# Patient Record
Sex: FEMALE
[De-identification: names, ages, dates, MRNs, and addresses within clinical notes are randomized per-mention and may not be internally consistent; named-entity substitution may affect disease eponyms.]

---

## 2018-04-02 ENCOUNTER — HOSPITAL ENCOUNTER (EMERGENCY)
Dept: HOSPITAL 14 - H.ER | Age: 63
Discharge: HOME | End: 2018-04-02
Payer: COMMERCIAL

## 2018-04-02 VITALS
DIASTOLIC BLOOD PRESSURE: 52 MMHG | TEMPERATURE: 97.3 F | RESPIRATION RATE: 18 BRPM | SYSTOLIC BLOOD PRESSURE: 112 MMHG | HEART RATE: 66 BPM | OXYGEN SATURATION: 99 %

## 2018-04-02 DIAGNOSIS — M17.12: Primary | ICD-10-CM

## 2018-04-02 DIAGNOSIS — E78.00: ICD-10-CM

## 2018-04-02 DIAGNOSIS — Z88.0: ICD-10-CM

## 2018-04-02 DIAGNOSIS — I10: ICD-10-CM

## 2018-04-02 NOTE — RAD
PROCEDURE:  Left Knee Radiographs.



HISTORY:

Pain. No history of recent/ related trauma provided



COMPARISON:

None.



FINDINGS:



BONES:

No acute fracture. Proliferative hypertrophic changes emanating from 

the femoral condyle and tibial plateau regions. 



JOINTS:

Degenerative changes medial compartment and patellofemoral joint 



JOINT EFFUSION:

None. 



OTHER FINDINGS:

None.



IMPRESSION:

Normal radiographs of the left knee.

## 2018-04-02 NOTE — ED PDOC
Lower Extremity Pain/Injury


Time Seen by Provider: 04/02/18 15:11


Chief Complaint (Nursing): Lower Extremity Problem/Injury


Chief Complaint (Provider): Left Knee Pain


History Per: Patient


History/Exam Limitations: no limitations


Onset/Duration Of Symptoms: Days (x1 week)


Current Symptoms Are (Timing): Still Present


Additional Complaint(s): 


61 y/o female with a pmhx of IBS, asthma, HTN, and hypercholesterolemia, who 

was brought in via EMS for evaluation of left leg injury. Patient states that 1 

week ago she woke up with a tightness behind her left knee. States that the 

sensation has been progressively worsening. Says she told her  

who advises her it may be a hamstring injury and he performed stretches with 

her for the past week with no improvement. Patient reports that while walking 

up the stairs today in her apartment complex, while carrying bags, her left 

knee gave out on her with sudden onset of pain. Patient reports she has not 

been taking medication for the pain, because its only painful when she bears 

weight. She denies calf pain, leg swelling, history of left knee injury, 

shortness of breath, chest pain, nausea, vomiting, and fever.





PMD: Jarrod Green








Past Medical History


Reviewed: Historical Data, Nursing Documentation, Vital Signs


Vital Signs: 





 Last Vital Signs











Temp  97.3 F L  04/02/18 15:10


 


Pulse  66   04/02/18 15:10


 


Resp  18   04/02/18 15:10


 


BP  112/52 L  04/02/18 15:10


 


Pulse Ox  99   04/02/18 15:10














- Medical History


PMH: Anxiety, Asthma, HTN, Hypercholesterolemia


   Denies: Chronic Kidney Disease





- Surgical History


Surgical History: No Surg Hx





- Family History


Family History: States: Unknown Family Hx





- Social History


Current smoker - smoking cessation education provided: No


Alcohol: None


Drugs: Denies





- Home Medications


Home Medications: 


 Ambulatory Orders











 Medication  Instructions  Recorded


 


Enalapril Maleate [Enalapril 1 tab PO DAILY 02/09/16





Maleate]  


 


Esomeprazole Magnesium [Nexium] 20 tab PO DAILY 02/09/16


 


Methylprednisolone [Medrol Dosepak] 4 mg PO ASDIR #1 pkg 02/09/16


 


Mometasone [Asmanex Twisthaler 110 1 puff INH BID 02/09/16





MCG]  


 


Potassium Chloride [Klor-Con 8] 1 tab PO BID 02/09/16


 


Simethicone [Gas-X] 1 tab PO QID 02/09/16


 


Simvastatin [Simvastatin] 1 tab PO DAILY 02/09/16


 


chlordiazePOXIDE-Clinidium [Librax 1 cap PO PRN PRN 02/09/16





5 MG-2.5 MG]  


 


hydroCHLOROthiazide [Hydrodiuril] 1 tab PO DAILY 02/09/16


 


Naproxen/Esomeprazole Mag [Vimovo 1 each PO BID #20 tab 04/02/18





Dr 500-20 mg Tablet]  














- Allergies


Allergies/Adverse Reactions: 


 Allergies











Allergy/AdvReac Type Severity Reaction Status Date / Time


 


ciprofloxacin [From Cipro] Allergy  RASH Verified 02/09/16 18:19


 


ciprofloxacin HCl Allergy  RASH Verified 02/09/16 18:19





[From Cipro]     


 


Penicillins Allergy  RASH Verified 02/09/16 18:19














Review of Systems


ROS Statement: Except As Marked, All Systems Reviewed And Found Negative


Constitutional: Negative for: Fever


Cardiovascular: Negative for: Chest Pain


Respiratory: Negative for: Shortness of Breath


Gastrointestinal: Negative for: Nausea, Vomiting


Musculoskeletal: Positive for: Leg Pain





Physical Exam





- Reviewed


Nursing Documentation Reviewed: Yes


Vital Signs Reviewed: Yes





- Physical Exam


Comments: 


GENERAL APPEARANCE: Patient is awake, alert, oriented x 3, in no acute 

distress. 


SKIN:  Warm, dry; (-) cyanosis.


CHEST AND RESPIRATORY: (-) chest wall tenderness. Lungs: (-) rales, (-) rhonchi

, (-) wheezes; breath sounds equal bilaterally.


HEART AND CARDIOVASCULAR: (-) irregularity; (-) murmur, (-) gallop.


ABDOMEN AND GI: Soft; (-) tenderness.


LEFT KNEE:  (-) reproducible tenderness, (+) decreased flexion secondary to pain

, (-) effusion, (-) erythema. Negative anterior and posterior draw sign, no 

evidence of instability on valgus or varus stress. (-) calf tenderness, (-) 

pedal edema. Positive distal pulses, sensation intact throughout. 








- ECG


O2 Sat by Pulse Oximetry: 99 (RA)


Pulse Ox Interpretation: Normal





Medical Decision Making


Medical Decision Making: 


Time: 15:35


Initial Impression: Acute knee pain 


Plan:   


--Left knee X-Ray


--Ultram 50mg PO (Patient will not be driving home)


--Zofran 4mg PO


--Reevaluation





Time: 17:12


PROCEDURE:  Left Knee Radiographs.


HISTORY:


Pain. No history of recent/ related trauma provided


COMPARISON:


None.


FINDINGS:


BONES:


No acute fracture. Proliferative hypertrophic changes emanating from the 

femoral condyle and tibial plateau regions. 


JOINTS:


Degenerative changes medial compartment and patellofemoral joint 


JOINT EFFUSION:


None. 


OTHER FINDINGS:


None.


IMPRESSION:


Normal radiographs of the left knee.





--Ultrasound ordered to rule out DVT. 





Time: 1900


US Left Lower Extremity:


HISTORY:


Knee pain .  


PRIORS:


None. 


FINDINGS:


2-D, color and duplex Doppler analysis of the lower extremity venous 

circulation using routine protocol from the femoral veins through the popliteal 

veins.


Venous compressibility: Normal. 


Flow and augmentation patterns: Normal. 


Visualized veins upper third of calf: Normal. 


Baker cyst: None. 


IMPRESSION:


No sonographic or Doppler evidence for DVT in left lower extremity








Time: 1945


On re-evaluation, patient reports improvement of symptoms, ambulatory in ED 

with steady gait. On exam, patient remains AAOx3, in no acute distress. Lungs 

clear to auscultation, cardiac RRR, abdomen soft, non-tender, repeat neuro exam 

shows no focal findings. VSS.


Lab/Diagnostic results d/w the patient in great detail. Diagnosis of acute knee 

pain/sprain, DJD of knee d/w the patient. 


Based on history, exam and diagnostic results, plan will be for outpatient 

follow up. 


Patient instructed to follow-up with pmd / referral provided / the clinic  in 1-

2 days without fail. Advised to take medication as prescribed. Return to the 

emergency room at any time for any new or worsening symptoms. Patient states 

she fully agrees with and understands discharge instructions. States that she 

agrees with the plan and disposition. Verbalized and repeated discharge 

instructions and plan. I have given the patient opportunity to ask any 

additional questions.





--------------------------------------------------------------------------------

-----------------


Scribe Attestation:   


Documented by Demetrius Esteves, acting as a scribe for Amira Hummel PA-C.





Provider Scribe Attestation:


All medical record entries made by the Scribe were at my direction and 

personally dictated by me. I have reviewed the chart and agree that the record 

accurately reflects my personal performance of the history, physical exam, 

medical decision making, and the department course for this patient. I have 

also personally directed, reviewed, and agree with the discharge instructions 

and disposition. 








Disposition





- Clinical Impression


Clinical Impression: 


 Knee pain, acute, Knee sprain, Osteoarthritis of knee








- Patient ED Disposition


Is Patient to be Admitted: No


Counseled Patient/Family Regarding: Studies Performed, Diagnosis, Need For 

Followup, Rx Given





- Disposition


Referrals: 


Wiliam Machado III, MD [Staff Provider] - 


Disposition: Routine/Home


Disposition Time: 19:44


Condition: STABLE


Prescriptions: 


Naproxen/Esomeprazole Mag [Vimovo Dr 500-20 mg Tablet] 1 each PO BID #20 tab


Instructions:  Osteoarthritis, Knee Sprain (DC), Knee Pain


Forms:  [facility] (English)


Print Language: ENGLISH

## 2018-04-02 NOTE — US
HISTORY:

Knee pain .  



PRIORS:

None. 



FINDINGS:

2-D, color and duplex Doppler analysis of the lower extremity venous 

circulation using routine protocol from the femoral veins through the 

popliteal veins.



Venous compressibility: Normal. 



Flow and augmentation patterns: Normal. 



Visualized veins upper third of calf: Normal. 



Baker cyst: None. 



IMPRESSION:

No sonographic or Doppler evidence for DVT in left lower extremity.

## 2021-06-01 ENCOUNTER — PREPPED CHART (OUTPATIENT)
Dept: URBAN - METROPOLITAN AREA CLINIC 110 | Facility: CLINIC | Age: 66
End: 2021-06-01

## 2021-06-01 PROBLEM — H25.813 COMBINED SENILE CATARACT: Noted: 2021-06-01

## 2021-06-01 PROBLEM — H04.123 DRY EYE SYNDROME: Noted: 2021-06-01

## 2022-09-09 ENCOUNTER — ESTABLISHED COMPREHENSIVE EXAM (OUTPATIENT)
Dept: URBAN - METROPOLITAN AREA CLINIC 110 | Facility: CLINIC | Age: 67
End: 2022-09-09

## 2022-09-09 DIAGNOSIS — H04.123: ICD-10-CM

## 2022-09-09 DIAGNOSIS — H40.013: ICD-10-CM

## 2022-09-09 DIAGNOSIS — H01.116: ICD-10-CM

## 2022-09-09 DIAGNOSIS — H01.113: ICD-10-CM

## 2022-09-09 DIAGNOSIS — H52.7: ICD-10-CM

## 2022-09-09 DIAGNOSIS — H43.393: ICD-10-CM

## 2022-09-09 DIAGNOSIS — H10.13: ICD-10-CM

## 2022-09-09 DIAGNOSIS — H25.813: ICD-10-CM

## 2022-09-09 PROCEDURE — 92015 DETERMINE REFRACTIVE STATE: CPT

## 2022-09-09 PROCEDURE — 92202 OPSCPY EXTND ON/MAC DRAW: CPT

## 2022-09-09 PROCEDURE — 92014 COMPRE OPH EXAM EST PT 1/>: CPT

## 2022-09-09 ASSESSMENT — TONOMETRY
OD_IOP_MMHG: 17
OS_IOP_MMHG: 18

## 2022-09-09 ASSESSMENT — VISUAL ACUITY
OS_CC: 20/25
OU_CC: J1+
OD_CC: 20/40

## 2022-09-23 ENCOUNTER — ESTABLISHED (OUTPATIENT)
Dept: URBAN - METROPOLITAN AREA CLINIC 110 | Facility: CLINIC | Age: 67
End: 2022-09-23

## 2022-09-23 DIAGNOSIS — H01.113: ICD-10-CM

## 2022-09-23 DIAGNOSIS — H10.13: ICD-10-CM

## 2022-09-23 DIAGNOSIS — H40.013: ICD-10-CM

## 2022-09-23 DIAGNOSIS — H01.116: ICD-10-CM

## 2022-09-23 PROCEDURE — 92202 OPSCPY EXTND ON/MAC DRAW: CPT

## 2022-09-23 PROCEDURE — 92012 INTRM OPH EXAM EST PATIENT: CPT

## 2022-09-23 PROCEDURE — 92133 CPTRZD OPH DX IMG PST SGM ON: CPT

## 2022-09-23 PROCEDURE — 92020 GONIOSCOPY: CPT

## 2022-09-23 ASSESSMENT — VISUAL ACUITY
OD_CC: 20/25-2
OS_CC: 20/20

## 2022-09-23 ASSESSMENT — TONOMETRY
OS_IOP_MMHG: 21
OD_IOP_MMHG: 20

## 2023-03-30 ENCOUNTER — ESTABLISHED (OUTPATIENT)
Dept: URBAN - METROPOLITAN AREA CLINIC 110 | Facility: CLINIC | Age: 68
End: 2023-03-30

## 2023-03-30 DIAGNOSIS — H40.013: ICD-10-CM

## 2023-03-30 DIAGNOSIS — H04.123: ICD-10-CM

## 2023-03-30 DIAGNOSIS — H25.813: ICD-10-CM

## 2023-03-30 DIAGNOSIS — H10.13: ICD-10-CM

## 2023-03-30 PROCEDURE — 92012 INTRM OPH EXAM EST PATIENT: CPT

## 2023-03-30 ASSESSMENT — VISUAL ACUITY
OS_CC: 20/25-1
OD_CC: 20/30-1

## 2023-03-30 ASSESSMENT — TONOMETRY
OD_IOP_MMHG: 16
OS_IOP_MMHG: 15

## 2023-04-27 ENCOUNTER — ESTABLISHED COMPREHENSIVE EXAM (OUTPATIENT)
Dept: URBAN - METROPOLITAN AREA CLINIC 110 | Facility: CLINIC | Age: 68
End: 2023-04-27

## 2023-04-27 DIAGNOSIS — H25.813: ICD-10-CM

## 2023-04-27 DIAGNOSIS — H40.013: ICD-10-CM

## 2023-04-27 DIAGNOSIS — H52.7: ICD-10-CM

## 2023-04-27 DIAGNOSIS — H43.393: ICD-10-CM

## 2023-04-27 DIAGNOSIS — H10.13: ICD-10-CM

## 2023-04-27 DIAGNOSIS — H04.123: ICD-10-CM

## 2023-04-27 PROCEDURE — 92020 GONIOSCOPY: CPT

## 2023-04-27 PROCEDURE — 92202 OPSCPY EXTND ON/MAC DRAW: CPT

## 2023-04-27 PROCEDURE — 92014 COMPRE OPH EXAM EST PT 1/>: CPT

## 2023-04-27 PROCEDURE — 92133 CPTRZD OPH DX IMG PST SGM ON: CPT

## 2023-04-27 ASSESSMENT — TONOMETRY
OS_IOP_MMHG: 17
OD_IOP_MMHG: 17

## 2023-04-27 ASSESSMENT — VISUAL ACUITY
OS_CC: 20/25-2
OD_CC: 20/40+3

## 2024-04-29 ENCOUNTER — ESTABLISHED COMPREHENSIVE EXAM (OUTPATIENT)
Dept: URBAN - METROPOLITAN AREA CLINIC 110 | Facility: CLINIC | Age: 69
End: 2024-04-29

## 2024-04-29 DIAGNOSIS — H25.813: ICD-10-CM

## 2024-04-29 DIAGNOSIS — H04.123: ICD-10-CM

## 2024-04-29 DIAGNOSIS — H10.13: ICD-10-CM

## 2024-04-29 DIAGNOSIS — H43.393: ICD-10-CM

## 2024-04-29 DIAGNOSIS — H40.013: ICD-10-CM

## 2024-04-29 PROCEDURE — 92014 COMPRE OPH EXAM EST PT 1/>: CPT

## 2024-04-29 PROCEDURE — 92020 GONIOSCOPY: CPT

## 2024-04-29 PROCEDURE — 92133 CPTRZD OPH DX IMG PST SGM ON: CPT

## 2024-04-29 ASSESSMENT — VISUAL ACUITY
OS_CC: 20/20-2
OD_CC: 20/30
OU_SC: 20/30-3

## 2024-04-29 ASSESSMENT — TONOMETRY
OD_IOP_MMHG: 15
OS_IOP_MMHG: 16

## (undated) RX ORDER — NEOMYCIN SULFATE, POLYMYXIN B SULFATE AND DEXAMETHASONE 3.5; 10000; 1 MG/G; [USP'U]/G; MG/G: 1/4 OINTMENT OPHTHALMIC TWICE A DAY